# Patient Record
Sex: MALE | Race: WHITE
[De-identification: names, ages, dates, MRNs, and addresses within clinical notes are randomized per-mention and may not be internally consistent; named-entity substitution may affect disease eponyms.]

---

## 2018-07-26 ENCOUNTER — HOSPITAL ENCOUNTER (EMERGENCY)
Dept: HOSPITAL 76 - ED | Age: 33
Discharge: HOME | End: 2018-07-26
Payer: COMMERCIAL

## 2018-07-26 VITALS — DIASTOLIC BLOOD PRESSURE: 94 MMHG | SYSTOLIC BLOOD PRESSURE: 135 MMHG

## 2018-07-26 DIAGNOSIS — R03.0: ICD-10-CM

## 2018-07-26 DIAGNOSIS — K02.9: Primary | ICD-10-CM

## 2018-07-26 PROCEDURE — 99283 EMERGENCY DEPT VISIT LOW MDM: CPT

## 2018-07-26 NOTE — ED PHYSICIAN DOCUMENTATION
PD HPI HEENT





- Stated complaint


Stated Complaint: TOOTH PAIN





- Chief complaint


Chief Complaint: Heent





- History obtained from


History obtained from: Patient





- History of Present Illness


Timing - onset: Today (He has had sensitivity from a right maxillary molar for 

a couple of days and it crack tonight and became severely painful.  Last saw 

his dentist about 4 months ago and there was no indication that this problem.  

No fevers or facial swelling at this juncture.)





Review of Systems


Constitutional: denies: Fever, Chills


Throat: reports: Dental pain / toothache.  denies: Sore throat


Cardiac: denies: Chest pain / pressure, Palpitations





PD PAST MEDICAL HISTORY





- Past Surgical History


Past Surgical History: No





- Present Medications


Home Medications: 


 Ambulatory Orders











 Medication  Instructions  Recorded  Confirmed


 


Oxycodone HCl/Acetaminophen 1 - 2 tab PO Q4H PRN #10 tablet 07/26/18 





[Percocet 5-325 mg Tablet]   


 


Penicillin V Potassium 500 mg PO QID #40 tablet 07/26/18 


 


Telmisartan [Micardis] 1 tab PO DAILY 07/26/18 07/26/18














- Allergies


Allergies/Adverse Reactions: 


 Allergies











Allergy/AdvReac Type Severity Reaction Status Date / Time


 


No Known Drug Allergies Allergy   Verified 07/26/18 21:44














- Social History


Does the pt smoke?: No


Smoking Status: Never smoker


Does the pt drink ETOH?: Yes


Does the pt have substance abuse?: No





- Immunizations


Immunizations are current?: Yes





PD ED PE NORMAL





- Vitals


Vital signs reviewed: Yes





- General


General: Alert and oriented X 3, No acute distress





- HEENT


HEENT: Other (3.  He has a large filling in it and laterally it is cracked and 

tender.  There is no facial or gingival swelling or trismus.)





- Neuro


Neuro: Alert and oriented X 3, Normal speech





- Psych


Psych: Normal mood, Normal affect





Results





- Vitals


Vitals: 


 Vital Signs - 24 hr











  07/26/18





  21:44


 


Temperature 36.0 C L


 


Heart Rate 106 H


 


Respiratory 16





Rate 


 


Blood Pressure 135/94 H


 


O2 Saturation 96








 Oxygen











O2 Source                      Room air

















PD MEDICAL DECISION MAKING





- Sepsis Event


Vital Signs: 


 Vital Signs - 24 hr











  07/26/18





  21:44


 


Temperature 36.0 C L


 


Heart Rate 106 H


 


Respiratory 16





Rate 


 


Blood Pressure 135/94 H


 


O2 Saturation 96








 Oxygen











O2 Source                      Room air

















Departure





- Departure


Disposition: 01 Home, Self Care


Clinical Impression: 


 Pain due to dental caries





Condition: Good


Record reviewed to determine appropriate education?: Yes


Instructions:  ED Tooth Pain


Prescriptions: 


Oxycodone HCl/Acetaminophen [Percocet 5-325 mg Tablet] 1 - 2 tab PO Q4H PRN #10 

tablet


 PRN Reason: Pain


Penicillin V Potassium 500 mg PO QID #40 tablet


Comments: 


Followup with your dentist tomorrow





Your blood pressure was elevated today on check into the emergency department.  

This does not mean that you have hypertension, it is a common phenomenon to 

come to the emergency department and have elevated blood pressure.  I recommend 

that you see your primary care physician within the week to have it rechecked 

when you are feeling better.


Forms:  Activity restrictions